# Patient Record
Sex: FEMALE | Race: WHITE | NOT HISPANIC OR LATINO | Employment: OTHER | URBAN - METROPOLITAN AREA
[De-identification: names, ages, dates, MRNs, and addresses within clinical notes are randomized per-mention and may not be internally consistent; named-entity substitution may affect disease eponyms.]

---

## 2023-03-14 ENCOUNTER — OFFICE VISIT (OUTPATIENT)
Dept: URGENT CARE | Facility: CLINIC | Age: 48
End: 2023-03-14
Payer: COMMERCIAL

## 2023-03-14 VITALS
HEIGHT: 65 IN | RESPIRATION RATE: 18 BRPM | SYSTOLIC BLOOD PRESSURE: 114 MMHG | WEIGHT: 140 LBS | HEART RATE: 76 BPM | BODY MASS INDEX: 23.32 KG/M2 | OXYGEN SATURATION: 97 % | DIASTOLIC BLOOD PRESSURE: 63 MMHG | TEMPERATURE: 98 F

## 2023-03-14 DIAGNOSIS — F41.9 ANXIETY: Primary | ICD-10-CM

## 2023-03-14 PROCEDURE — 99202 OFFICE O/P NEW SF 15 MIN: CPT | Mod: ,,, | Performed by: PHYSICIAN ASSISTANT

## 2023-03-14 PROCEDURE — 99202 PR OFFICE/OUTPT VISIT, NEW, LEVL II, 15-29 MIN: ICD-10-PCS | Mod: ,,, | Performed by: PHYSICIAN ASSISTANT

## 2023-03-14 RX ORDER — METRONIDAZOLE 500 MG/1
TABLET ORAL
COMMUNITY
Start: 2023-03-05

## 2023-03-14 NOTE — PROGRESS NOTES
"Subjective:       Patient ID: Bonny Kumari is a 48 y.o. female.    Vitals:  height is 5' 5" (1.651 m) and weight is 63.5 kg (140 lb). Her oral temperature is 97.8 °F (36.6 °C). Her blood pressure is 114/63 and her pulse is 76. Her respiration is 18 and oxygen saturation is 97%.     Chief Complaint: Medication Problem    Patient started taking flagyl x7 days ago for BV. Patient states she started noticing increased anxiety the day after the first dose. Patient prescribed this medication by Dr. Yessy Cheung.  Patient does have a prior history of anxiety/panic attacks and at 1 point in time was treated with Prozac and Ativan.  she has not taken Prozac and Ativan in a long time.  Patient states that she is here to have her vital signs checked and wants reassurance.    ROS    Objective:      Physical Exam   Constitutional: She is oriented to person, place, and time. She appears well-developed. She is cooperative.  Non-toxic appearance. She does not appear ill. No distress.   HENT:   Head: Normocephalic and atraumatic.   Ears:   Right Ear: Hearing normal.   Left Ear: Hearing normal.   Eyes: Conjunctivae and lids are normal. No scleral icterus.   Neck: Trachea normal and phonation normal. Neck supple. No edema present. No erythema present. No neck rigidity present.   Cardiovascular: Normal rate, regular rhythm, normal heart sounds and normal pulses.   Pulmonary/Chest: Effort normal and breath sounds normal. No respiratory distress. She has no decreased breath sounds. She has no rhonchi.   Abdominal: Normal appearance.   Musculoskeletal: Normal range of motion.         General: No deformity. Normal range of motion.   Neurological: She is alert and oriented to person, place, and time. She exhibits normal muscle tone. Coordination normal.   Skin: Skin is warm, dry, intact, not diaphoretic and not pale.   Psychiatric: Her speech is normal and behavior is normal. Judgment and thought content normal.   Nursing note and " "vitals reviewed.    Patient does appear anxious.  She has completed her course of Flagyl at this time.  I discussed with the her that while it is possible that she had an adverse effect to Flagyl it is not a common listed adverse effect.  I also discussed the possibility that it is just coincidence that she is having an anxiety flare up while on this medication she may consider restarting anxiety medication.  Patient declines a prescription for acute anxiety symptoms at this time.         Previous History      Review of patient's allergies indicates:  No Known Allergies    Past Medical History:   Diagnosis Date    Anxiety      Current Outpatient Medications   Medication Instructions    metroNIDAZOLE (FLAGYL) 500 MG tablet SMARTSI Tablet(s) By Mouth Morning-Night     Past Surgical History:   Procedure Laterality Date     SECTION       Family History   Problem Relation Age of Onset    No Known Problems Mother     No Known Problems Father        Social History     Tobacco Use    Smoking status: Every Day     Types: Vaping with nicotine     Passive exposure: Never    Smokeless tobacco: Never   Substance Use Topics    Alcohol use: Yes    Drug use: Not Currently        Physical Exam      Vital Signs Reviewed   /63   Pulse 76   Temp 97.8 °F (36.6 °C) (Oral)   Resp 18   Ht 5' 5" (1.651 m)   Wt 63.5 kg (140 lb)   SpO2 97%   BMI 23.30 kg/m²        Procedures    Procedures     Labs   No results found for this or any previous visit.  Assessment:       1. Anxiety          Plan:         Anxiety    Follow up with your PCP.     Go to the ER with any significant change or worsening of symptoms.                  "

## 2023-03-14 NOTE — PATIENT INSTRUCTIONS
Follow up with your PCP.     Go to the ER with any significant change or worsening of symptoms.